# Patient Record
Sex: FEMALE | Race: WHITE | NOT HISPANIC OR LATINO
[De-identification: names, ages, dates, MRNs, and addresses within clinical notes are randomized per-mention and may not be internally consistent; named-entity substitution may affect disease eponyms.]

---

## 2017-05-19 ENCOUNTER — APPOINTMENT (OUTPATIENT)
Dept: INTERNAL MEDICINE | Facility: CLINIC | Age: 67
End: 2017-05-19

## 2017-05-19 VITALS
SYSTOLIC BLOOD PRESSURE: 113 MMHG | DIASTOLIC BLOOD PRESSURE: 79 MMHG | TEMPERATURE: 99.1 F | HEART RATE: 94 BPM | OXYGEN SATURATION: 96 % | BODY MASS INDEX: 21.87 KG/M2 | WEIGHT: 125 LBS | HEIGHT: 63.5 IN

## 2017-05-19 LAB
BILIRUB UR QL STRIP: NEGATIVE
CLARITY UR: CLEAR
COLLECTION METHOD: NORMAL
GLUCOSE UR-MCNC: NEGATIVE
HCG UR QL: 0.2 EU/DL
HGB UR QL STRIP.AUTO: NORMAL
KETONES UR-MCNC: NEGATIVE
LEUKOCYTE ESTERASE UR QL STRIP: NORMAL
NITRITE UR QL STRIP: NEGATIVE
PH UR STRIP: 6
PROT UR STRIP-MCNC: NEGATIVE
SP GR UR STRIP: 1.02

## 2017-05-19 RX ORDER — BUSPIRONE HYDROCHLORIDE 10 MG/1
10 TABLET ORAL
Qty: 60 | Refills: 0 | Status: COMPLETED | COMMUNITY
Start: 2016-12-19

## 2017-05-19 RX ORDER — LORAZEPAM 0.5 MG/1
0.5 TABLET ORAL
Qty: 90 | Refills: 0 | Status: ACTIVE | COMMUNITY
Start: 2017-02-03

## 2017-05-19 RX ORDER — CYCLOBENZAPRINE HYDROCHLORIDE 10 MG/1
10 TABLET, FILM COATED ORAL
Qty: 30 | Refills: 0 | Status: COMPLETED | COMMUNITY
Start: 2016-08-04

## 2017-05-19 RX ORDER — FLUTICASONE FUROATE 100 UG/1
100 POWDER RESPIRATORY (INHALATION)
Qty: 30 | Refills: 0 | Status: COMPLETED | COMMUNITY
Start: 2016-11-09

## 2017-08-15 ENCOUNTER — MEDICATION RENEWAL (OUTPATIENT)
Age: 67
End: 2017-08-15

## 2017-12-07 ENCOUNTER — OUTPATIENT (OUTPATIENT)
Dept: OUTPATIENT SERVICES | Facility: HOSPITAL | Age: 67
LOS: 1 days | End: 2017-12-07
Payer: MEDICARE

## 2017-12-07 DIAGNOSIS — M54.16 RADICULOPATHY, LUMBAR REGION: ICD-10-CM

## 2017-12-07 DIAGNOSIS — D36.13 BENIGN NEOPLASM OF PERIPHERAL NERVES AND AUTONOMIC NERVOUS SYSTEM OF LOWER LIMB, INCLUDING HIP: Chronic | ICD-10-CM

## 2017-12-07 DIAGNOSIS — Z98.89 OTHER SPECIFIED POSTPROCEDURAL STATES: Chronic | ICD-10-CM

## 2017-12-07 PROCEDURE — 62323 NJX INTERLAMINAR LMBR/SAC: CPT

## 2017-12-07 PROCEDURE — 77003 FLUOROGUIDE FOR SPINE INJECT: CPT

## 2017-12-20 ENCOUNTER — OUTPATIENT (OUTPATIENT)
Dept: OUTPATIENT SERVICES | Facility: HOSPITAL | Age: 67
LOS: 1 days | End: 2017-12-20
Payer: MEDICARE

## 2017-12-20 DIAGNOSIS — Z98.89 OTHER SPECIFIED POSTPROCEDURAL STATES: Chronic | ICD-10-CM

## 2017-12-20 DIAGNOSIS — D36.13 BENIGN NEOPLASM OF PERIPHERAL NERVES AND AUTONOMIC NERVOUS SYSTEM OF LOWER LIMB, INCLUDING HIP: Chronic | ICD-10-CM

## 2017-12-20 DIAGNOSIS — M54.16 RADICULOPATHY, LUMBAR REGION: ICD-10-CM

## 2017-12-20 PROCEDURE — 77003 FLUOROGUIDE FOR SPINE INJECT: CPT

## 2017-12-20 PROCEDURE — 62323 NJX INTERLAMINAR LMBR/SAC: CPT

## 2018-01-03 ENCOUNTER — RX RENEWAL (OUTPATIENT)
Age: 68
End: 2018-01-03

## 2018-04-20 ENCOUNTER — RX RENEWAL (OUTPATIENT)
Age: 68
End: 2018-04-20

## 2018-06-26 ENCOUNTER — TRANSCRIPTION ENCOUNTER (OUTPATIENT)
Age: 68
End: 2018-06-26

## 2018-06-26 ENCOUNTER — APPOINTMENT (OUTPATIENT)
Dept: INTERNAL MEDICINE | Facility: CLINIC | Age: 68
End: 2018-06-26
Payer: MEDICARE

## 2018-06-26 ENCOUNTER — NON-APPOINTMENT (OUTPATIENT)
Age: 68
End: 2018-06-26

## 2018-06-26 VITALS
HEIGHT: 63.5 IN | TEMPERATURE: 98.6 F | SYSTOLIC BLOOD PRESSURE: 125 MMHG | BODY MASS INDEX: 21.7 KG/M2 | HEART RATE: 87 BPM | DIASTOLIC BLOOD PRESSURE: 80 MMHG | WEIGHT: 124 LBS | OXYGEN SATURATION: 96 %

## 2018-06-26 DIAGNOSIS — R68.89 OTHER GENERAL SYMPTOMS AND SIGNS: ICD-10-CM

## 2018-06-26 DIAGNOSIS — K59.00 CONSTIPATION, UNSPECIFIED: ICD-10-CM

## 2018-06-26 DIAGNOSIS — R05 COUGH: ICD-10-CM

## 2018-06-26 DIAGNOSIS — Z86.39 PERSONAL HISTORY OF OTHER ENDOCRINE, NUTRITIONAL AND METABOLIC DISEASE: ICD-10-CM

## 2018-06-26 PROCEDURE — G0439: CPT

## 2018-06-26 PROCEDURE — 93000 ELECTROCARDIOGRAM COMPLETE: CPT

## 2018-06-26 PROCEDURE — 36415 COLL VENOUS BLD VENIPUNCTURE: CPT

## 2018-06-26 PROCEDURE — 99214 OFFICE O/P EST MOD 30 MIN: CPT | Mod: 25

## 2018-06-26 RX ORDER — ZALEPLON 5 MG/1
5 CAPSULE ORAL
Qty: 30 | Refills: 0 | Status: ACTIVE | COMMUNITY
Start: 2018-06-26

## 2018-06-26 RX ORDER — ZALEPLON 10 MG/1
10 CAPSULE ORAL
Qty: 30 | Refills: 0 | Status: DISCONTINUED | COMMUNITY
Start: 2016-10-14 | End: 2018-06-26

## 2018-06-26 RX ORDER — ZALEPLON 5 MG/1
5 CAPSULE ORAL
Qty: 60 | Refills: 0 | Status: DISCONTINUED | COMMUNITY
Start: 2016-11-28 | End: 2018-06-26

## 2018-06-26 NOTE — PHYSICAL EXAM
[No Acute Distress] : no acute distress [Well Nourished] : well nourished [Normal Sclera/Conjunctiva] : normal sclera/conjunctiva [PERRL] : pupils equal round and reactive to light [Normal Outer Ear/Nose] : the outer ears and nose were normal in appearance [Normal Oropharynx] : the oropharynx was normal [No JVD] : no jugular venous distention [Supple] : supple [No Respiratory Distress] : no respiratory distress  [Clear to Auscultation] : lungs were clear to auscultation bilaterally [Normal Rate] : normal rate  [Regular Rhythm] : with a regular rhythm [de-identified] : not congested

## 2018-06-26 NOTE — PLAN
[FreeTextEntry1] : Annual exam\par had flu and prevnar\par see gyn and mammo\par colon dec\par \par \par numerous issues mostly chronic\par throat clearing and cough will try benzoate\par rhinorrhea on ipratropium\par insomnia\par fibromyalgia and back ache\par full blood and check thyroid

## 2018-06-26 NOTE — HISTORY OF PRESENT ILLNESS
[FreeTextEntry1] : Annual [de-identified] : Annual exam\par last colon dec 2017(ct scan)\par SEE GYN\par Yearly mammo\par \par \par fibromyalgia    is not well\par back is not well to neuro\par see Dr Roshan treadwell on thyroid??\par still trouble sleeping\par chronic cough since surgery for pth(In chest)\par no drip\par ent is trying omeprazole  couple weeks no help\par diaphram damaged\par no heartburn\par

## 2018-06-27 LAB
25(OH)D3 SERPL-MCNC: 18.3 NG/ML
ALBUMIN SERPL ELPH-MCNC: 4.4 G/DL
ALP BLD-CCNC: 73 U/L
ALT SERPL-CCNC: 14 U/L
ANION GAP SERPL CALC-SCNC: 15 MMOL/L
AST SERPL-CCNC: 19 U/L
BASOPHILS # BLD AUTO: 0.01 K/UL
BASOPHILS NFR BLD AUTO: 0.1 %
BILIRUB SERPL-MCNC: 0.4 MG/DL
BUN SERPL-MCNC: 31 MG/DL
CALCIUM SERPL-MCNC: 9.3 MG/DL
CHLORIDE SERPL-SCNC: 103 MMOL/L
CHOLEST SERPL-MCNC: 228 MG/DL
CHOLEST/HDLC SERPL: 4.9 RATIO
CO2 SERPL-SCNC: 24 MMOL/L
CREAT SERPL-MCNC: 1.09 MG/DL
EOSINOPHIL # BLD AUTO: 0.05 K/UL
EOSINOPHIL NFR BLD AUTO: 0.7 %
GLUCOSE SERPL-MCNC: 85 MG/DL
HBA1C MFR BLD HPLC: 5.1 %
HCT VFR BLD CALC: 42.8 %
HDLC SERPL-MCNC: 47 MG/DL
HGB BLD-MCNC: 14.1 G/DL
IMM GRANULOCYTES NFR BLD AUTO: 0.4 %
LDLC SERPL CALC-MCNC: 123 MG/DL
LYMPHOCYTES # BLD AUTO: 1.57 K/UL
LYMPHOCYTES NFR BLD AUTO: 21.8 %
MAN DIFF?: NORMAL
MCHC RBC-ENTMCNC: 29 PG
MCHC RBC-ENTMCNC: 32.9 GM/DL
MCV RBC AUTO: 87.9 FL
MONOCYTES # BLD AUTO: 0.6 K/UL
MONOCYTES NFR BLD AUTO: 8.3 %
NEUTROPHILS # BLD AUTO: 4.95 K/UL
NEUTROPHILS NFR BLD AUTO: 68.7 %
PLATELET # BLD AUTO: 259 K/UL
POTASSIUM SERPL-SCNC: 5.1 MMOL/L
PROT SERPL-MCNC: 7 G/DL
RBC # BLD: 4.87 M/UL
RBC # FLD: 13.6 %
SODIUM SERPL-SCNC: 142 MMOL/L
T4 SERPL-MCNC: 9.1 UG/DL
TRIGL SERPL-MCNC: 291 MG/DL
TSH SERPL-ACNC: 2.1 UIU/ML
WBC # FLD AUTO: 7.21 K/UL

## 2018-07-31 ENCOUNTER — APPOINTMENT (OUTPATIENT)
Dept: OTOLARYNGOLOGY | Facility: CLINIC | Age: 68
End: 2018-07-31

## 2018-08-13 ENCOUNTER — RESULT REVIEW (OUTPATIENT)
Age: 68
End: 2018-08-13

## 2018-08-15 ENCOUNTER — MEDICATION RENEWAL (OUTPATIENT)
Age: 68
End: 2018-08-15

## 2018-10-09 ENCOUNTER — RX RENEWAL (OUTPATIENT)
Age: 68
End: 2018-10-09

## 2018-12-18 ENCOUNTER — MEDICATION RENEWAL (OUTPATIENT)
Age: 68
End: 2018-12-18

## 2019-01-30 ENCOUNTER — APPOINTMENT (OUTPATIENT)
Dept: ORTHOPEDIC SURGERY | Facility: CLINIC | Age: 69
End: 2019-01-30
Payer: MEDICARE

## 2019-01-30 VITALS — WEIGHT: 124 LBS | HEIGHT: 63.5 IN | BODY MASS INDEX: 21.7 KG/M2

## 2019-01-30 DIAGNOSIS — S70.11XA CONTUSION OF RIGHT THIGH, INITIAL ENCOUNTER: ICD-10-CM

## 2019-01-30 DIAGNOSIS — S40.011A CONTUSION OF RIGHT SHOULDER, INITIAL ENCOUNTER: ICD-10-CM

## 2019-01-30 PROCEDURE — 99203 OFFICE O/P NEW LOW 30 MIN: CPT

## 2019-01-30 PROCEDURE — 73030 X-RAY EXAM OF SHOULDER: CPT | Mod: RT

## 2019-02-07 ENCOUNTER — MEDICATION RENEWAL (OUTPATIENT)
Age: 69
End: 2019-02-07

## 2019-04-11 ENCOUNTER — TRANSCRIPTION ENCOUNTER (OUTPATIENT)
Age: 69
End: 2019-04-11

## 2019-04-22 ENCOUNTER — RX RENEWAL (OUTPATIENT)
Age: 69
End: 2019-04-22

## 2019-07-29 ENCOUNTER — APPOINTMENT (OUTPATIENT)
Dept: INTERNAL MEDICINE | Facility: CLINIC | Age: 69
End: 2019-07-29
Payer: MEDICARE

## 2019-07-29 ENCOUNTER — NON-APPOINTMENT (OUTPATIENT)
Age: 69
End: 2019-07-29

## 2019-07-29 VITALS
HEIGHT: 63.5 IN | HEART RATE: 90 BPM | BODY MASS INDEX: 23.45 KG/M2 | DIASTOLIC BLOOD PRESSURE: 83 MMHG | SYSTOLIC BLOOD PRESSURE: 116 MMHG | TEMPERATURE: 98.7 F | WEIGHT: 134 LBS | OXYGEN SATURATION: 97 %

## 2019-07-29 DIAGNOSIS — G47.00 INSOMNIA, UNSPECIFIED: ICD-10-CM

## 2019-07-29 DIAGNOSIS — Z87.440 PERSONAL HISTORY OF URINARY (TRACT) INFECTIONS: ICD-10-CM

## 2019-07-29 LAB
BILIRUB UR QL STRIP: NORMAL
CLARITY UR: NORMAL
COLLECTION METHOD: NORMAL
GLUCOSE UR-MCNC: NORMAL
HCG UR QL: 0.2 EU/DL
HGB UR QL STRIP.AUTO: NORMAL
KETONES UR-MCNC: NORMAL
LEUKOCYTE ESTERASE UR QL STRIP: NORMAL
NITRITE UR QL STRIP: NORMAL
PH UR STRIP: 5.5
PROT UR STRIP-MCNC: NORMAL
SP GR UR STRIP: 1.02

## 2019-07-29 PROCEDURE — 99214 OFFICE O/P EST MOD 30 MIN: CPT | Mod: 25

## 2019-07-29 PROCEDURE — 36415 COLL VENOUS BLD VENIPUNCTURE: CPT

## 2019-07-29 PROCEDURE — 93000 ELECTROCARDIOGRAM COMPLETE: CPT

## 2019-07-29 PROCEDURE — G0439: CPT

## 2019-07-29 RX ORDER — BENZONATATE 100 MG/1
100 CAPSULE ORAL 3 TIMES DAILY
Qty: 90 | Refills: 1 | Status: DISCONTINUED | COMMUNITY
Start: 2018-06-26 | End: 2019-07-29

## 2019-07-29 RX ORDER — CETIRIZINE HYDROCHLORIDE 10 MG/1
10 TABLET, FILM COATED ORAL DAILY
Qty: 30 | Refills: 0 | Status: ACTIVE | COMMUNITY
Start: 2019-07-29

## 2019-07-29 RX ORDER — LEVOTHYROXINE SODIUM 0.05 MG/1
50 TABLET ORAL
Qty: 90 | Refills: 3 | Status: ACTIVE | COMMUNITY
Start: 2019-07-29

## 2019-07-29 NOTE — PHYSICAL EXAM
[No Acute Distress] : no acute distress [Normal Outer Ear/Nose] : the outer ears and nose were normal in appearance [No Lymphadenopathy] : no lymphadenopathy [No JVD] : no jugular venous distention [Normal Oropharynx] : the oropharynx was normal [No Respiratory Distress] : no respiratory distress  [No Accessory Muscle Use] : no accessory muscle use [Normal Rate] : normal rate  [No HSM] : no HSM [Regular Rhythm] : with a regular rhythm [No Edema] : there was no peripheral edema

## 2019-07-29 NOTE — REVIEW OF SYSTEMS
[Fever] : no fever [Night Sweats] : no night sweats [Earache] : no earache [Nasal Discharge] : no nasal discharge [Chest Pain] : no chest pain [Shortness Of Breath] : no shortness of breath [Orthopnea] : no orthopnea [Wheezing] : no wheezing [Abdominal Pain] : no abdominal pain [Vomiting] : no vomiting [Muscle Pain] : no muscle pain [Joint Pain] : no joint pain

## 2019-07-30 LAB
25(OH)D3 SERPL-MCNC: 67.8 NG/ML
ALBUMIN SERPL ELPH-MCNC: 4.5 G/DL
ALP BLD-CCNC: 75 U/L
ALT SERPL-CCNC: 11 U/L
ANION GAP SERPL CALC-SCNC: 13 MMOL/L
AST SERPL-CCNC: 14 U/L
BASOPHILS # BLD AUTO: 0.02 K/UL
BASOPHILS NFR BLD AUTO: 0.2 %
BILIRUB SERPL-MCNC: 0.4 MG/DL
BUN SERPL-MCNC: 25 MG/DL
CALCIUM SERPL-MCNC: 9.4 MG/DL
CHLORIDE SERPL-SCNC: 105 MMOL/L
CHOLEST SERPL-MCNC: 180 MG/DL
CHOLEST/HDLC SERPL: 4.4 RATIO
CO2 SERPL-SCNC: 26 MMOL/L
CREAT SERPL-MCNC: 1.17 MG/DL
EOSINOPHIL # BLD AUTO: 0.04 K/UL
EOSINOPHIL NFR BLD AUTO: 0.5 %
ESTIMATED AVERAGE GLUCOSE: 97 MG/DL
GLUCOSE SERPL-MCNC: 76 MG/DL
HBA1C MFR BLD HPLC: 5 %
HCT VFR BLD CALC: 43.7 %
HDLC SERPL-MCNC: 41 MG/DL
HGB BLD-MCNC: 14.3 G/DL
IMM GRANULOCYTES NFR BLD AUTO: 0.3 %
LDLC SERPL CALC-MCNC: 102 MG/DL
LYMPHOCYTES # BLD AUTO: 1.77 K/UL
LYMPHOCYTES NFR BLD AUTO: 20.5 %
MAN DIFF?: NORMAL
MCHC RBC-ENTMCNC: 29.2 PG
MCHC RBC-ENTMCNC: 32.7 GM/DL
MCV RBC AUTO: 89.2 FL
MEV IGG FLD QL IA: >300 AU/ML
MEV IGG+IGM SER-IMP: POSITIVE
MONOCYTES # BLD AUTO: 0.6 K/UL
MONOCYTES NFR BLD AUTO: 7 %
MUV AB SER-ACNC: POSITIVE
MUV IGG SER QL IA: 74.1 AU/ML
NEUTROPHILS # BLD AUTO: 6.17 K/UL
NEUTROPHILS NFR BLD AUTO: 71.5 %
PLATELET # BLD AUTO: 282 K/UL
POTASSIUM SERPL-SCNC: 4.7 MMOL/L
PROT SERPL-MCNC: 6.8 G/DL
RBC # BLD: 4.9 M/UL
RBC # FLD: 13 %
RUBV IGG FLD-ACNC: <0.1 INDEX
RUBV IGG SER-IMP: NEGATIVE
SODIUM SERPL-SCNC: 144 MMOL/L
T4 FREE SERPL-MCNC: 1.2 NG/DL
TRIGL SERPL-MCNC: 187 MG/DL
TSH SERPL-ACNC: 2.3 UIU/ML
WBC # FLD AUTO: 8.63 K/UL

## 2019-09-25 ENCOUNTER — RESULT REVIEW (OUTPATIENT)
Age: 69
End: 2019-09-25

## 2019-10-30 ENCOUNTER — RX RENEWAL (OUTPATIENT)
Age: 69
End: 2019-10-30

## 2020-01-17 ENCOUNTER — APPOINTMENT (OUTPATIENT)
Dept: INTERNAL MEDICINE | Facility: CLINIC | Age: 70
End: 2020-01-17
Payer: MEDICARE

## 2020-01-17 VITALS
HEIGHT: 63.5 IN | DIASTOLIC BLOOD PRESSURE: 84 MMHG | TEMPERATURE: 98.5 F | SYSTOLIC BLOOD PRESSURE: 125 MMHG | HEART RATE: 101 BPM | OXYGEN SATURATION: 95 %

## 2020-01-17 DIAGNOSIS — M54.9 DORSALGIA, UNSPECIFIED: ICD-10-CM

## 2020-01-17 DIAGNOSIS — F41.8 OTHER SPECIFIED ANXIETY DISORDERS: ICD-10-CM

## 2020-01-17 PROCEDURE — 99214 OFFICE O/P EST MOD 30 MIN: CPT | Mod: 25

## 2020-01-17 PROCEDURE — 36415 COLL VENOUS BLD VENIPUNCTURE: CPT

## 2020-01-17 NOTE — PLAN
[FreeTextEntry1] : NUMEROUS ISSUE\par refilled med\par 6 month blood tests\par lipids and thyroid discussed

## 2020-01-17 NOTE — HISTORY OF PRESENT ILLNESS
[de-identified] : Fatique and fibromyalgia\par see cardiologist Dr Allison\par Rheum Dr Figueroa\par trouble sleeping\par  [FreeTextEntry1] : Hailey

## 2020-01-17 NOTE — PHYSICAL EXAM
[Well Nourished] : well nourished [Normal Outer Ear/Nose] : the outer ears and nose were normal in appearance [No Acute Distress] : no acute distress [No JVD] : no jugular venous distention [No Lymphadenopathy] : no lymphadenopathy [Normal Oropharynx] : the oropharynx was normal [No Accessory Muscle Use] : no accessory muscle use [No Respiratory Distress] : no respiratory distress  [Normal Rate] : normal rate  [Regular Rhythm] : with a regular rhythm [No Edema] : there was no peripheral edema [No HSM] : no HSM [Soft] : abdomen soft

## 2020-01-17 NOTE — REVIEW OF SYSTEMS
[Fever] : no fever [Nasal Discharge] : no nasal discharge [Earache] : no earache [Night Sweats] : no night sweats [Orthopnea] : no orthopnea [Shortness Of Breath] : no shortness of breath [Chest Pain] : no chest pain [Wheezing] : no wheezing [Abdominal Pain] : no abdominal pain [Vomiting] : no vomiting

## 2020-01-19 LAB
24R-OH-CALCIDIOL SERPL-MCNC: 98.8 PG/ML
25(OH)D3 SERPL-MCNC: 67 NG/ML
ALBUMIN SERPL ELPH-MCNC: 5 G/DL
ALP BLD-CCNC: 77 U/L
ALT SERPL-CCNC: 10 U/L
ANION GAP SERPL CALC-SCNC: 16 MMOL/L
AST SERPL-CCNC: 16 U/L
BASOPHILS # BLD AUTO: 0.02 K/UL
BASOPHILS NFR BLD AUTO: 0.3 %
BILIRUB SERPL-MCNC: 0.6 MG/DL
BUN SERPL-MCNC: 24 MG/DL
CALCIUM SERPL-MCNC: 9.9 MG/DL
CHLORIDE SERPL-SCNC: 101 MMOL/L
CHOLEST SERPL-MCNC: 207 MG/DL
CHOLEST/HDLC SERPL: 3.9 RATIO
CO2 SERPL-SCNC: 25 MMOL/L
CREAT SERPL-MCNC: 1.04 MG/DL
EOSINOPHIL # BLD AUTO: 0.04 K/UL
EOSINOPHIL NFR BLD AUTO: 0.5 %
ESTIMATED AVERAGE GLUCOSE: 97 MG/DL
FOLATE SERPL-MCNC: 6.1 NG/ML
GLUCOSE SERPL-MCNC: 68 MG/DL
HBA1C MFR BLD HPLC: 5 %
HCT VFR BLD CALC: 47.9 %
HDLC SERPL-MCNC: 53 MG/DL
HGB BLD-MCNC: 15.8 G/DL
IMM GRANULOCYTES NFR BLD AUTO: 0.3 %
LDLC SERPL CALC-MCNC: 120 MG/DL
LYMPHOCYTES # BLD AUTO: 1.85 K/UL
LYMPHOCYTES NFR BLD AUTO: 23.2 %
MAN DIFF?: NORMAL
MCHC RBC-ENTMCNC: 29.1 PG
MCHC RBC-ENTMCNC: 33 GM/DL
MCV RBC AUTO: 88.2 FL
MONOCYTES # BLD AUTO: 0.61 K/UL
MONOCYTES NFR BLD AUTO: 7.6 %
NEUTROPHILS # BLD AUTO: 5.45 K/UL
NEUTROPHILS NFR BLD AUTO: 68.1 %
PLATELET # BLD AUTO: 281 K/UL
POTASSIUM SERPL-SCNC: 4.9 MMOL/L
PROT SERPL-MCNC: 7.5 G/DL
RBC # BLD: 5.43 M/UL
RBC # FLD: 13.8 %
SODIUM SERPL-SCNC: 141 MMOL/L
T4 FREE SERPL-MCNC: 1.4 NG/DL
TRIGL SERPL-MCNC: 172 MG/DL
TSH SERPL-ACNC: 3.03 UIU/ML
VIT B12 SERPL-MCNC: 1474 PG/ML
WBC # FLD AUTO: 7.99 K/UL

## 2020-04-03 ENCOUNTER — RX RENEWAL (OUTPATIENT)
Age: 70
End: 2020-04-03

## 2020-12-02 ENCOUNTER — APPOINTMENT (OUTPATIENT)
Dept: UROLOGY | Facility: CLINIC | Age: 70
End: 2020-12-02
Payer: MEDICARE

## 2020-12-02 DIAGNOSIS — N28.89 OTHER SPECIFIED DISORDERS OF KIDNEY AND URETER: ICD-10-CM

## 2020-12-02 PROCEDURE — 99204 OFFICE O/P NEW MOD 45 MIN: CPT

## 2020-12-09 PROBLEM — N28.89 LEFT RENAL MASS: Status: ACTIVE | Noted: 2020-12-09

## 2020-12-09 NOTE — HISTORY OF PRESENT ILLNESS
[FreeTextEntry1] : 69y/o woman\par multiple medical problems\par Here for evaluation of left renal mass.\par \par Several CT and MRI images reviewed from 2014 to present: enhancing 1.4 cm Left midpole renal mass.\par Mass has been stable in size since at least 2014.

## 2020-12-09 NOTE — PHYSICAL EXAM
[General Appearance - Well Developed] : well developed [General Appearance - Well Nourished] : well nourished [Normal Appearance] : normal appearance [Well Groomed] : well groomed [General Appearance - In No Acute Distress] : no acute distress [] : no respiratory distress [Respiration, Rhythm And Depth] : normal respiratory rhythm and effort [Exaggerated Use Of Accessory Muscles For Inspiration] : no accessory muscle use [Abdomen Soft] : soft [Normal Station and Gait] : the gait and station were normal for the patient's age [Skin Color & Pigmentation] : normal skin color and pigmentation [Oriented To Time, Place, And Person] : oriented to person, place, and time

## 2020-12-09 NOTE — ASSESSMENT
[FreeTextEntry1] : Reviewed all relevant images.\par Mass has been stable since 2014.\par Suggested 2 options:\par Continue to monitor vs. IR consult for cryoablation\par She will monitor for now.

## 2021-06-02 ENCOUNTER — APPOINTMENT (OUTPATIENT)
Dept: UROLOGY | Facility: CLINIC | Age: 71
End: 2021-06-02

## 2021-06-12 ENCOUNTER — RX RENEWAL (OUTPATIENT)
Age: 71
End: 2021-06-12

## 2021-10-13 ENCOUNTER — APPOINTMENT (OUTPATIENT)
Dept: INTERNAL MEDICINE | Facility: CLINIC | Age: 71
End: 2021-10-13
Payer: MEDICARE

## 2021-10-13 VITALS
SYSTOLIC BLOOD PRESSURE: 124 MMHG | BODY MASS INDEX: 22.75 KG/M2 | DIASTOLIC BLOOD PRESSURE: 78 MMHG | TEMPERATURE: 97 F | HEIGHT: 63.5 IN | WEIGHT: 130 LBS

## 2021-10-13 VITALS
HEIGHT: 63.5 IN | HEART RATE: 72 BPM | DIASTOLIC BLOOD PRESSURE: 87 MMHG | TEMPERATURE: 97.5 F | OXYGEN SATURATION: 82 % | WEIGHT: 130 LBS | BODY MASS INDEX: 22.75 KG/M2 | SYSTOLIC BLOOD PRESSURE: 139 MMHG

## 2021-10-13 DIAGNOSIS — J30.9 ALLERGIC RHINITIS, UNSPECIFIED: ICD-10-CM

## 2021-10-13 DIAGNOSIS — E78.00 PURE HYPERCHOLESTEROLEMIA, UNSPECIFIED: ICD-10-CM

## 2021-10-13 DIAGNOSIS — C85.90 NON-HODGKIN LYMPHOMA, UNSPECIFIED, UNSPECIFIED SITE: ICD-10-CM

## 2021-10-13 DIAGNOSIS — E03.9 HYPOTHYROIDISM, UNSPECIFIED: ICD-10-CM

## 2021-10-13 DIAGNOSIS — C83.30 DIFFUSE LARGE B-CELL LYMPHOMA, UNSPECIFIED SITE: ICD-10-CM

## 2021-10-13 PROCEDURE — G0439: CPT

## 2021-10-13 RX ORDER — MONTELUKAST 10 MG/1
10 TABLET, FILM COATED ORAL
Qty: 1 | Refills: 3 | Status: DISCONTINUED | COMMUNITY
Start: 2019-07-29 | End: 2021-10-13

## 2021-10-13 RX ORDER — PRAVASTATIN SODIUM 10 MG/1
10 TABLET ORAL
Qty: 30 | Refills: 0 | Status: ACTIVE | COMMUNITY
Start: 2019-07-29

## 2021-10-13 RX ORDER — CYCLOBENZAPRINE HYDROCHLORIDE 5 MG/1
5 TABLET, FILM COATED ORAL
Qty: 45 | Refills: 2 | Status: DISCONTINUED | COMMUNITY
Start: 2017-05-11 | End: 2021-10-13

## 2021-10-13 RX ORDER — SUVOREXANT 10 MG/1
10 TABLET, FILM COATED ORAL
Qty: 30 | Refills: 0 | Status: DISCONTINUED | COMMUNITY
Start: 2018-06-26 | End: 2021-10-13

## 2021-10-13 NOTE — HEALTH RISK ASSESSMENT
[Fair] :  ~his/her~ mood as fair [No] : No [Never (0 pts)] : Never (0 points) [No falls in past year] : Patient reported no falls in the past year [1] : 2) Feeling down, depressed, or hopeless for several days (1) [None] : None [With Family] : lives with family [Retired] : retired [College] : College [] :  [Fully functional (bathing, dressing, toileting, transferring, walking, feeding)] : Fully functional (bathing, dressing, toileting, transferring, walking, feeding) [Fully functional (using the telephone, shopping, preparing meals, housekeeping, doing laundry, using] : Fully functional and needs no help or supervision to perform IADLs (using the telephone, shopping, preparing meals, housekeeping, doing laundry, using transportation, managing medications and managing finances) [Smoke Detector] : smoke detector [Carbon Monoxide Detector] : carbon monoxide detector [Seat Belt] :  uses seat belt [Patient declined mammogram] : Patient declined mammogram [Patient declined colonoscopy] : Patient declined colonoscopy [] : No [Change in mental status noted] : No change in mental status noted [Language] : denies difficulty with language [Behavior] : denies difficulty with behavior [Learning/Retaining New Information] : denies difficulty learning/retaining new information [Handling Complex Tasks] : denies difficulty handling complex tasks [Reasoning] : denies difficulty with reasoning [Spatial Ability and Orientation] : denies difficulty with spatial ability and orientation [Reports changes in hearing] : Reports no changes in hearing [Reports changes in vision] : Reports no changes in vision [Reports changes in dental health] : Reports no changes in dental health [Guns at Home] : no guns at home

## 2021-10-13 NOTE — PLAN
[FreeTextEntry1] : Annual exam\par had all vaccine\par under care for lymphoma\par \par review of issue\par thyroid per endocrine\par oncology follow relevant issues\par

## 2021-10-13 NOTE — HISTORY OF PRESENT ILLNESS
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

## 2021-10-27 ENCOUNTER — RX CHANGE (OUTPATIENT)
Age: 71
End: 2021-10-27

## 2021-10-27 RX ORDER — IPRATROPIUM BROMIDE 42 UG/1
0.06 SPRAY NASAL
Qty: 15 | Refills: 2 | Status: DISCONTINUED | COMMUNITY
Start: 2017-05-19 | End: 2021-10-27

## 2021-10-28 ENCOUNTER — APPOINTMENT (OUTPATIENT)
Dept: ULTRASOUND IMAGING | Facility: CLINIC | Age: 71
End: 2021-10-28

## 2021-10-28 ENCOUNTER — APPOINTMENT (OUTPATIENT)
Dept: MAMMOGRAPHY | Facility: CLINIC | Age: 71
End: 2021-10-28

## 2022-03-09 ENCOUNTER — RX RENEWAL (OUTPATIENT)
Age: 72
End: 2022-03-09

## 2022-05-05 ENCOUNTER — RESULT REVIEW (OUTPATIENT)
Age: 72
End: 2022-05-05

## 2022-06-15 ENCOUNTER — RX RENEWAL (OUTPATIENT)
Age: 72
End: 2022-06-15

## 2022-08-02 ENCOUNTER — FORM ENCOUNTER (OUTPATIENT)
Age: 72
End: 2022-08-02

## 2022-08-03 ENCOUNTER — APPOINTMENT (OUTPATIENT)
Dept: MRI IMAGING | Facility: CLINIC | Age: 72
End: 2022-08-03

## 2022-08-03 ENCOUNTER — APPOINTMENT (OUTPATIENT)
Dept: ORTHOPEDIC SURGERY | Facility: CLINIC | Age: 72
End: 2022-08-03

## 2022-08-03 VITALS — HEIGHT: 63 IN | WEIGHT: 130 LBS | BODY MASS INDEX: 23.04 KG/M2

## 2022-08-03 DIAGNOSIS — S46.012A STRAIN OF MUSCLE(S) AND TENDON(S) OF THE ROTATOR CUFF OF LEFT SHOULDER, INITIAL ENCOUNTER: ICD-10-CM

## 2022-08-03 DIAGNOSIS — C80.1 MALIGNANT (PRIMARY) NEOPLASM, UNSPECIFIED: ICD-10-CM

## 2022-08-03 DIAGNOSIS — Z00.00 ENCOUNTER FOR GENERAL ADULT MEDICAL EXAMINATION W/OUT ABNORMAL FINDINGS: ICD-10-CM

## 2022-08-03 PROCEDURE — 99204 OFFICE O/P NEW MOD 45 MIN: CPT

## 2022-08-03 PROCEDURE — 73010 X-RAY EXAM OF SHOULDER BLADE: CPT | Mod: LT

## 2022-08-03 PROCEDURE — 73221 MRI JOINT UPR EXTREM W/O DYE: CPT | Mod: LT,MH

## 2022-08-03 PROCEDURE — 73030 X-RAY EXAM OF SHOULDER: CPT | Mod: LT

## 2022-08-03 NOTE — HISTORY OF PRESENT ILLNESS
[Sudden] : sudden [5] : 5 [0] : 0 [Burning] : burning [Frequent] : frequent [Household chores] : household chores [Leisure] : leisure [Sleep] : sleep [Retired] : Work status: retired [de-identified] : Left shoulder pain after falling in [employer] last Thursday. [] : no [FreeTextEntry1] : Left shoulder [FreeTextEntry3] : 7/28/22 [FreeTextEntry9] : tylenol [de-identified] : reaching [de-identified] : 7/30/22 [de-identified] : Urgent Care [de-identified] : xrays

## 2022-08-03 NOTE — REASON FOR VISIT
[FreeTextEntry2] : This is a 71 year old RHD female retired  with left shoulder pain since 7/28/22 after a fall. She went to urgent care where she had xrays taken. There were no fractures. No prior history. Reaching is painful, has trouble raising her arm overhead. Sleep is not affected. There is no n/t. Tylenol use as needed with temporary relief. Her  is here. She has history of CNS lymphoma.

## 2022-08-03 NOTE — CONSULT LETTER
[Dear  ___] : Dear  [unfilled], [Consult Letter:] : I had the pleasure of evaluating your patient, [unfilled]. [Please see my note below.] : Please see my note below. [Consult Closing:] : Thank you very much for allowing me to participate in the care of this patient.  If you have any questions, please do not hesitate to contact me. [Sincerely,] : Sincerely, [FreeTextEntry3] : Jones Martinez M.D.\par Shoulder Surgery

## 2022-08-03 NOTE — IMAGING
[Left] : left shoulder [FreeTextEntry1] : The GH and AC joints are intact and well aligned. [FreeTextEntry5] : There is a type II acromion.

## 2022-08-03 NOTE — PHYSICAL EXAM
[Left] : left shoulder [Right] : right shoulder [Mild] : mild [Sitting] : sitting [] : no erythema [FreeTextEntry3] : There is a 5cm mid anterior arm ecchymotic area. [de-identified] : Pain with flexion, external rotation is decent.  [FreeTextEntry9] : IR to T12. [TWNoteComboBox6] : internal rotation L2 [TWNoteComboBox4] : passive forward flexion 150 degrees [de-identified] : external rotation 90 degrees

## 2022-08-03 NOTE — ASSESSMENT
[FreeTextEntry1] : We discussed the underlying pathology. \par Treatment options reviewed. \par Her  is here. \par She will continue with Advil and Tylenol.\par An MRI of the left shoulder is indicated\par She will follow up after the MRI.\par If symptoms persist, a MDP may be considered. \par Cautions discussed. \par Questions answered. \par \par Patient seen by Jones Contreras M.D.\par Entered by Zita Sanchez acting as scribe.

## 2022-08-10 ENCOUNTER — APPOINTMENT (OUTPATIENT)
Dept: ORTHOPEDIC SURGERY | Facility: CLINIC | Age: 72
End: 2022-08-10

## 2022-08-15 ENCOUNTER — APPOINTMENT (OUTPATIENT)
Dept: ORTHOPEDIC SURGERY | Facility: CLINIC | Age: 72
End: 2022-08-15

## 2022-08-15 VITALS — BODY MASS INDEX: 23.04 KG/M2 | HEIGHT: 63 IN | WEIGHT: 130 LBS

## 2022-08-15 PROCEDURE — 99214 OFFICE O/P EST MOD 30 MIN: CPT

## 2022-08-15 NOTE — HISTORY OF PRESENT ILLNESS
[0] : 0 [de-identified] : pt is here today for a test follow up for her left shoulder. pt states her pain has improved since last visit, she is still in sling today. pt states she does have some left sided rib pain [FreeTextEntry1] : left shoulder [de-identified] : none

## 2022-08-15 NOTE — ASSESSMENT
[FreeTextEntry1] : We discussed the MRI findings. \par She will discontinue the sling as discussed. \par PT is prescribed. \par HEP encouraged.\par Cautions discussed. \par Questions answered. \par \par Patient seen by Jones Contreras M.D. \par Entered by Zita Sanchez acting as scribe.

## 2022-08-15 NOTE — PHYSICAL EXAM
[Left] : left shoulder [Mild] : mild [Right] : right shoulder [Sitting] : sitting [] : no erythema [FreeTextEntry3] : There is a 5cm mid anterior arm ecchymotic area. [de-identified] : Pain with flexion, external rotation is decent.  [FreeTextEntry9] : IR to T12. [TWNoteComboBox7] : active forward flexion 120 degrees [TWNoteComboBox6] : internal rotation L2 [TWNoteComboBox4] : passive forward flexion 150 degrees [de-identified] : external rotation 90 degrees

## 2022-08-15 NOTE — REASON FOR VISIT
[FreeTextEntry2] : This is a 71 year old RHD female retired  with left shoulder pain since 7/28/22 after a fall. She went to urgent care where she had xrays taken. There were no fractures. No prior history. Reaching is painful, has trouble raising her arm overhead. Sleep is not affected. There is no n/t. Tylenol use as needed with temporary relief. Her  is here. She has history of CNS lymphoma. MRI is done.

## 2022-08-15 NOTE — DATA REVIEWED
[FreeTextEntry1] : MRI left shoulder 8/3/22:\par The fracture is in decent position. There is no major cuff tear. 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

## 2022-08-21 ENCOUNTER — RX RENEWAL (OUTPATIENT)
Age: 72
End: 2022-08-21

## 2022-08-21 RX ORDER — IPRATROPIUM BROMIDE 42 UG/1
0.06 SPRAY NASAL
Qty: 1 | Refills: 5 | Status: ACTIVE | COMMUNITY
Start: 2021-10-27 | End: 1900-01-01

## 2022-09-28 ENCOUNTER — APPOINTMENT (OUTPATIENT)
Dept: ORTHOPEDIC SURGERY | Facility: CLINIC | Age: 72
End: 2022-09-28

## 2022-09-28 VITALS — BODY MASS INDEX: 23.04 KG/M2 | WEIGHT: 130 LBS | HEIGHT: 63 IN

## 2022-09-28 DIAGNOSIS — S42.255A NONDISPLACED FRACTURE OF GREATER TUBEROSITY OF LEFT HUMERUS, INITIAL ENCOUNTER FOR CLOSED FRACTURE: ICD-10-CM

## 2022-09-28 PROCEDURE — 99214 OFFICE O/P EST MOD 30 MIN: CPT

## 2022-09-28 PROCEDURE — 99072 ADDL SUPL MATRL&STAF TM PHE: CPT

## 2022-09-28 NOTE — PHYSICAL EXAM
[Left] : left shoulder [Right] : right shoulder [Sitting] : sitting [5 ___] : forward flexion 5[unfilled]/5 [5___] : external rotation 5[unfilled]/5 [] : no erythema [FreeTextEntry8] : This is slight. [FreeTextEntry9] : IR to T12. [TWNoteComboBox7] : active forward flexion 120 degrees [TWNoteComboBox6] : internal rotation L2 [TWNoteComboBox4] : passive forward flexion 150 degrees [de-identified] : external rotation 90 degrees

## 2022-09-28 NOTE — ASSESSMENT
[FreeTextEntry1] : She is doing very well.\par PT in [place] is an option.\par Her and her 's questions were answered.\par Follow up here will be at their request.

## 2022-09-28 NOTE — REASON FOR VISIT
[FreeTextEntry2] : This is a 72 year old RHD female retired  with left shoulder pain since 7/28/22 after a fall. She went to urgent care where she had xrays taken. There were no fractures. No prior history. There is no n/t. She has history of CNS lymphoma. MRI is done.  With PT, she feels 90% better.  Her  is here.

## 2022-09-28 NOTE — PHYSICAL EXAM
[Left] : left shoulder [Right] : right shoulder [Sitting] : sitting [5 ___] : forward flexion 5[unfilled]/5 [5___] : external rotation 5[unfilled]/5 [] : no erythema [FreeTextEntry8] : This is slight. [FreeTextEntry9] : IR to T12. [TWNoteComboBox7] : active forward flexion 120 degrees [TWNoteComboBox6] : internal rotation L2 [TWNoteComboBox4] : passive forward flexion 150 degrees [de-identified] : external rotation 90 degrees

## 2022-09-28 NOTE — HISTORY OF PRESENT ILLNESS
[1] : 2 [0] : 0 [Dull/Aching] : dull/aching [] : yes [Intermittent] : intermittent [Heat] : heat [Retired] : Work status: retired [de-identified] : Patient is here for a follow up on her left shoulder.  [FreeTextEntry1] : left shoulder [FreeTextEntry7] : upper arm [de-identified] : Physical therapy 2 x a week, HEP.

## 2022-09-28 NOTE — HISTORY OF PRESENT ILLNESS
[1] : 2 [0] : 0 [Dull/Aching] : dull/aching [] : yes [Intermittent] : intermittent [Heat] : heat [Retired] : Work status: retired [de-identified] : Patient is here for a follow up on her left shoulder.  [FreeTextEntry1] : left shoulder [FreeTextEntry7] : upper arm [de-identified] : Physical therapy 2 x a week, HEP.

## 2022-10-02 ENCOUNTER — RX RENEWAL (OUTPATIENT)
Age: 72
End: 2022-10-02

## 2022-10-02 RX ORDER — GABAPENTIN 300 MG/1
300 CAPSULE ORAL
Qty: 90 | Refills: 5 | Status: ACTIVE | COMMUNITY
Start: 2018-06-26 | End: 1900-01-01

## 2023-02-25 ENCOUNTER — TRANSCRIPTION ENCOUNTER (OUTPATIENT)
Age: 73
End: 2023-02-25

## 2023-04-18 ENCOUNTER — NON-APPOINTMENT (OUTPATIENT)
Age: 73
End: 2023-04-18

## 2023-05-11 ENCOUNTER — APPOINTMENT (OUTPATIENT)
Dept: ORTHOPEDIC SURGERY | Facility: CLINIC | Age: 73
End: 2023-05-11
Payer: MEDICARE

## 2023-05-11 VITALS — WEIGHT: 130 LBS | BODY MASS INDEX: 23.04 KG/M2 | HEIGHT: 63 IN

## 2023-05-11 DIAGNOSIS — M19.049 PRIMARY OSTEOARTHRITIS, UNSPECIFIED HAND: ICD-10-CM

## 2023-05-11 PROCEDURE — 99213 OFFICE O/P EST LOW 20 MIN: CPT

## 2023-05-11 PROCEDURE — 73130 X-RAY EXAM OF HAND: CPT | Mod: 50

## 2023-05-11 NOTE — IMAGING
[de-identified] : LEFT HAND\par skin intact. no swelling.\par mild TTP to radial styloid.\par wrist ROM: good extension, flexion. good pronation, supination.\par good EPL, FPL. good finger extension, flex to full fist. good finger abduction and adduction. \par SILT to median, ulnar, radial distribution. \par palpable radial pulse, brisk cap refill all digits.\par no triggering.\par equivocal Finkelstein's test.\par \par \par RIGHT HAND\par skin intact. mild swelling over dorsal radial hand.\par TTP to 2nd CMCJ. mild TTP to PIPJ of IF/MF.\par wrist ROM: mild limited extension, flexion. good pronation, supination.\par good EPL, FPL. good finger extension, flex to full fist. good finger abduction and adduction. \par SILT to median, ulnar, radial distribution. \par palpable radial pulse, brisk cap refill all digits.\par no triggering.\par \par \par XRAYS OF LEFT HAND: no acute displaced fracture or dislocation. djd of DIPJ of index, middle fingers.\par XRAYS OF LEFT HAND: no acute displaced fracture or dislocation. djd of DIPJ of index, middle fingers. mild djd of thumb CMCJ.

## 2023-05-11 NOTE — HISTORY OF PRESENT ILLNESS
[Gradual] : gradual [6] : 6 [9] : 9 [Burning] : burning [Radiating] : radiating [Intermittent] : intermittent [Nothing helps with pain getting better] : Nothing helps with pain getting better [Retired] : Work status: retired [de-identified] : 5/11/23: 71yo RHD female (retired) presents with  for RIGHT > LEFT hand/wrist pain for a couple months.\par Denies injury.\par Using Tylenol, Arnica gel PRN. Currently on Xarelto for LLE DVT, unable to take Advil, which she usually uses for pain with good relief.\par Ambulates with cane/walker.\par Seeing Rheumatologist for fibromyalgia.\par \par Hx: fibromyalgia. DVT of LLE (4/2023) - on Xarelto. hypothyroidism. lymphoma. HLD. anxiety.\par s/p VATS thymectomy for hypercellular parathyroid. [] : no [de-identified] : usage [de-identified] : xrays

## 2023-05-11 NOTE — ASSESSMENT
[FreeTextEntry1] : The condition was explained to the patient.\par Discussed that arthritis is a progressive degenerative process, and symptoms may have a waxing/waning course, which may be exacerbated by activity or trauma. Discussed treatment options - activity modification, bracing, steroid injection, or last resort surgery.\par - recommend wrist brace, activity modification, moist heat vs ice PRN.\par - medical management of arthritis per Rheumatologist. cannot take NSAIDs until off anticoagulation for DVT.\par \par F/u PRN.

## 2023-05-16 ENCOUNTER — APPOINTMENT (OUTPATIENT)
Dept: VASCULAR SURGERY | Facility: CLINIC | Age: 73
End: 2023-05-16

## 2023-05-23 ENCOUNTER — NON-APPOINTMENT (OUTPATIENT)
Age: 73
End: 2023-05-23

## 2023-05-25 ENCOUNTER — APPOINTMENT (OUTPATIENT)
Dept: ORTHOPEDIC SURGERY | Facility: CLINIC | Age: 73
End: 2023-05-25

## 2023-06-22 ENCOUNTER — OUTPATIENT (OUTPATIENT)
Dept: OUTPATIENT SERVICES | Facility: HOSPITAL | Age: 73
LOS: 1 days | End: 2023-06-22
Payer: MEDICARE

## 2023-06-22 ENCOUNTER — APPOINTMENT (OUTPATIENT)
Dept: CT IMAGING | Facility: CLINIC | Age: 73
End: 2023-06-22
Payer: MEDICARE

## 2023-06-22 DIAGNOSIS — Z00.8 ENCOUNTER FOR OTHER GENERAL EXAMINATION: ICD-10-CM

## 2023-06-22 DIAGNOSIS — D36.13 BENIGN NEOPLASM OF PERIPHERAL NERVES AND AUTONOMIC NERVOUS SYSTEM OF LOWER LIMB, INCLUDING HIP: Chronic | ICD-10-CM

## 2023-06-22 DIAGNOSIS — Z98.89 OTHER SPECIFIED POSTPROCEDURAL STATES: Chronic | ICD-10-CM

## 2023-06-22 PROCEDURE — 74176 CT ABD & PELVIS W/O CONTRAST: CPT | Mod: 26,MH

## 2023-06-22 PROCEDURE — 74176 CT ABD & PELVIS W/O CONTRAST: CPT

## 2023-07-03 ENCOUNTER — APPOINTMENT (OUTPATIENT)
Dept: ORTHOPEDIC SURGERY | Facility: CLINIC | Age: 73
End: 2023-07-03
Payer: MEDICARE

## 2023-07-03 VITALS — BODY MASS INDEX: 20.91 KG/M2 | WEIGHT: 118 LBS | HEIGHT: 63 IN

## 2023-07-03 DIAGNOSIS — M79.7 FIBROMYALGIA: ICD-10-CM

## 2023-07-03 PROCEDURE — 73070 X-RAY EXAM OF ELBOW: CPT | Mod: RT

## 2023-07-03 PROCEDURE — 99204 OFFICE O/P NEW MOD 45 MIN: CPT

## 2023-07-03 PROCEDURE — 73030 X-RAY EXAM OF SHOULDER: CPT | Mod: RT

## 2023-07-03 NOTE — ADDENDUM
[FreeTextEntry1] : I, LOVELYMATILDE MUNOZ, acted solely as a scribe for Dr. Alfonso Kaplan on this date 07/03/2023.\par \par All medical record entries made by the Scribe were at my, Dr. Alfonso Kaplan, direction and personally dictated by me on 07/03/2023. I have reviewed the chart and agree that the record accurately reflects my personal performance of the history, physical exam, assessment and plan. I have also personally directed, reviewed, and agreed with the chart.

## 2023-07-03 NOTE — DISCUSSION/SUMMARY
[de-identified] : I instructed her on applying moist heat on her thigh and ice on her shoulder. She was reassured that she does not have any fractures. She will follow-up in 6 weeks. Should her pain persist after 6 weeks, an MRI will be ordered.

## 2023-07-03 NOTE — CONSULT LETTER
[Dear  ___] : Dear  [unfilled], [Consult Letter:] : I had the pleasure of evaluating your patient, [unfilled]. [Please see my note below.] : Please see my note below. [Consult Closing:] : Thank you very much for allowing me to participate in the care of this patient.  If you have any questions, please do not hesitate to contact me. [Sincerely,] : Sincerely, [FreeTextEntry2] : SVITLANA SELF  [FreeTextEntry3] : Alfonso Kaplan M.D.

## 2023-07-03 NOTE — REASON FOR VISIT
[Shoulder Pain] : shoulder pain [Initial Visit] : an initial visit for [FreeTextEntry2] : thigh pain

## 2023-07-03 NOTE — PHYSICAL EXAM
[de-identified] : Constitutional\par o Appearance : well-nourished, well developed, alert, in no acute distress \par Head and Face\par o Head :\par ¦ Inspection : atraumatic, normocephalic\par Neurologic\par o Mental Status Examination :\par ¦ Orientation : alert and oriented X 3\par Psychiatric\par o Mood and Affect: mood normal, affect appropriate \par Cardiovascular\par o Observation/Palpation : - no swelling,normal pulses, normal temperature \par Lymphatic\par o Additional Nodes : No palpable lymph nodes present \par \par Cervical Spine\par o Inspection/Palpation :\par ¦ Inspection : alignment midline, normal degree of lordosis present\par ¦ Skin : normal appearance, no masses or tenderness, trachea midline\par ¦ Palpation : musculature is nontender to palpation\par o Range of Motion : arc of motion full in all planes, no crepitance or pain with ROM\par Tests: Negative Spurling test, negative Romberg test\par \par Right Upper Extremity\par o Shoulder :\par ¦ Inspection/Palpation : Anterior capsule tenderness, lateral epercodnal tenderness,  mild greater tuberosity tenderness, swelling or deformities, no ecchymosis\par ¦ Range of Motion : full and painless in all planes, no crepitance\par ¦ Strength : forward elevation, internal rotation, external rotation, supraspinatus, adduction and abduction 5/5\par ¦ Stability : no joint instability on provocative testing \par Tests: Tatum negative, Neer negative, negative Dread test negative drop arm test\par \par o Right Elbow :\par ¦ Inspection/Palpation : anterior capsule tenderness, no swelling or deformities\par ¦ Range of Motion : full and painless in all planes, no crepitance\par ¦ Strength : flexion and extension 3-/5\par ¦ Stability : no joint instability on provocative testing \par o Sensation : sensation intact to light touch\par o Tests: Tinel’s Sign negative, no pain with resisted extension and supination, pain with resisted extension of the middle finger,  pain with resisted extension of the wrist,  pain with gripping\par \par Left Upper Extremity\par o Shoulder :\par ¦ Inspection/Palpation : no tenderness, swelling or deformities\par ¦ Range of Motion : full and painless in all planes, no crepitance\par ¦ Strength : forward elevation, internal rotation, external rotation, adduction and abduction 5/5\par ¦ Stability : no joint instability on provocative testing\par Tests: Tatum negative, Neer negative, negative Dread test, negative drop arm test\par \par o Left Elbow :\par ¦ Inspection/Palpation : anterior capsule tenderness, no swelling or deformities\par ¦ Range of Motion : full and painless in all planes, no crepitance\par ¦ Strength : flexion and extension 5/5\par ¦ Stability : no joint instability on provocative testing \par o Sensation : sensation intact to light touch\par o Tests: Tinel’s Sign negative, no pain with resisted extension and supination, no pain with resisted extension of the middle finger, no pain with resisted extension of the wrist, no pain with gripping\par \par Gait: walks with a cane  WBAT, no Significant extremity swelling or lymphedema. \par \par Radiology Results:\par Right Shoulder: AP IR/ER and outlet views were obtained and revealed sclerosis greater tuberosity and mild to moderate OA of the glenohumeral joint and AC joint. No lytic lesions. \par Right Elbow: AP, lateral and tunnel views were obtained and revealed moderate OA of the elbow with small area calcific density sclerotic lateral epicondyle.

## 2023-07-03 NOTE — HISTORY OF PRESENT ILLNESS
[de-identified] : 72 year old RHD female presents for right shoulder and elbow pain that started a few months ago. There was no recent injury. She has history of right shoulder OA and lateral epicondylitis in the right elbow. Her pain worsens with certain motions, especially when placing a mug on the counter. Her pain radiates into her right upper arm. It does not radiate into her forearm or wrist and hand. She denies numbness and tingling. She feels her strength is slightly limited by pain. She has some pain when sleeping at night. She saw an Orthopedist in [place] who told her that her upper arm pain was bursitis but she was not treated. Of note, She has had a brain biopsy in the past for CNS lymphoma 7 years ago. She is also s/p two parathyroid surgeries. She has a history of fibromyalgia. \par \par Radiology Results \par o Right Shoulder : AP internal/external rotation and lateral views were obtained and reveal moderate arthritis of the glenohumeral joint and sclerosis of the greater tuberosity, no fracture present.

## 2023-07-05 ENCOUNTER — APPOINTMENT (OUTPATIENT)
Dept: ORTHOPEDIC SURGERY | Facility: CLINIC | Age: 73
End: 2023-07-05

## 2023-08-03 ENCOUNTER — APPOINTMENT (OUTPATIENT)
Dept: ORTHOPEDIC SURGERY | Facility: CLINIC | Age: 73
End: 2023-08-03
Payer: MEDICARE

## 2023-08-03 VITALS — BODY MASS INDEX: 20.91 KG/M2 | WEIGHT: 118 LBS | HEIGHT: 63 IN

## 2023-08-03 DIAGNOSIS — M77.11 LATERAL EPICONDYLITIS, RIGHT ELBOW: ICD-10-CM

## 2023-08-03 PROCEDURE — 20605 DRAIN/INJ JOINT/BURSA W/O US: CPT | Mod: RT

## 2023-08-03 PROCEDURE — 99213 OFFICE O/P EST LOW 20 MIN: CPT | Mod: 25

## 2023-08-03 NOTE — REASON FOR VISIT
[Initial Visit] : an initial visit for [Shoulder Pain] : shoulder pain [FreeTextEntry2] : thigh pain

## 2023-08-03 NOTE — DISCUSSION/SUMMARY
[de-identified] : I went over the pathophysiology of the patient's symptoms in great detail with the patient. The patient elected to receive a cortisone injection into her right elbow today and tolerated it well. I instructed the patient on ROM exercises and told them to take it easy. The use of ice and rest was reviewed with the patient. The patient may resume activities tomorrow. At-home strengthening exercises were discussed and demonstrated with the patient. I am recommending the patient continues to go to physical therapy to obtain increases in their strength and mobility. A prescription was provided.   All of their questions were answered. They understand and consent to the plan.   FU in one month.

## 2023-08-03 NOTE — PHYSICAL EXAM
[de-identified] : Constitutional o Appearance : well-nourished, well developed, alert, in no acute distress  Head and Face o Head :  Inspection : atraumatic, normocephalic Neurologic o Mental Status Examination :  Orientation : alert and oriented X 3 Psychiatric o Mood and Affect: mood normal, affect appropriate  Cardiovascular o Observation/Palpation : - no swelling,normal pulses, normal temperature  Lymphatic o Additional Nodes : No palpable lymph nodes present   Cervical Spine o Inspection/Palpation :  Inspection : alignment midline, normal degree of lordosis present  Skin : normal appearance, no masses or tenderness, trachea midline  Palpation : musculature is nontender to palpation o Range of Motion : arc of motion full in all planes, no crepitance or pain with ROM Tests: Negative Spurling test, negative Romberg test  Right Upper Extremity o Shoulder :  Inspection/Palpation : Anterior capsule tenderness, lateral epercodnal tenderness,  mild greater tuberosity tenderness, swelling or deformities, no ecchymosis  Range of Motion : full and painless in all planes, no crepitance  Strength : forward elevation, internal rotation, external rotation, supraspinatus, adduction and abduction 5/5  Stability : no joint instability on provocative testing  Tests: Tatum negative, Neer negative, negative Dread test negative drop arm test  o Right Elbow :  Inspection/Palpation : anterior capsule tenderness, significant epicondyle tenderness, no swelling or deformities  Range of Motion : full and painless in all planes, no crepitance,   Strength : flexion and extension 3-/5  Stability : no joint instability on provocative testing  o Sensation : sensation intact to light touch o Tests: Tinel's Sign negative, no pain with resisted extension and supination, pain with resisted extension of the middle finger,  pain with resisted extension of the wrist,  pain with gripping  Left Upper Extremity o Shoulder :  Inspection/Palpation : no tenderness, swelling or deformities  Range of Motion : full and painless in all planes, no crepitance  Strength : forward elevation, internal rotation, external rotation, adduction and abduction 5/5  Stability : no joint instability on provocative testing Tests: Tatum negative, Neer negative, negative Dread test, negative drop arm test  o Left Elbow :  Inspection/Palpation : anterior capsule tenderness, no swelling or deformities  Range of Motion : full and painless in all planes, no crepitance  Strength : flexion and extension 5/5  Stability : no joint instability on provocative testing  o Sensation : sensation intact to light touch o Tests: Tinel's Sign negative, no pain with resisted extension and supination, no pain with resisted extension of the middle finger, no pain with resisted extension of the wrist, no pain with gripping  Gait: walks with a cane  WBAT, no Significant extremity swelling or lymphedema.   o Elbow : Indication- right lateral epicondylitis, Anatomic location- right area of the lateral epicondyle, Spray - area was sterilized with Betadine and alcohol and anesthetized with Ethyl Chloride , needle used-25G, Medications given- 1cc's lidocaine, 0.5cc's kenalog, 0.5cc's dexamethasone. The patient tolerated the procedure well.

## 2023-08-03 NOTE — HISTORY OF PRESENT ILLNESS
[de-identified] : 72-year-old RHD female presents for a follow-up of the right shoulder and elbow pain. She continues to have significant pain. She has history of right shoulder OA and lateral epicondylitis in the right elbow.  She is currently attending formal PT. She wants to continue attending PT. She is also s/p two parathyroid surgeries. She has a history of fibromyalgia.   Radiology Results: 07/03/2023 Right Shoulder: AP IR/ER and outlet views were obtained and revealed sclerosis greater tuberosity and mild to moderate OA of the glenohumeral joint and AC joint. No lytic lesions.  Right Elbow: AP, lateral and tunnel views were obtained and revealed moderate OA of the elbow with small area calcific density sclerotic lateral epicondyle.   Radiology Results  o Right Shoulder : AP internal/external rotation and lateral views were obtained and reveal moderate arthritis of the glenohumeral joint and sclerosis of the greater tuberosity, no fracture present.

## 2023-09-11 ENCOUNTER — APPOINTMENT (OUTPATIENT)
Dept: ORTHOPEDIC SURGERY | Facility: CLINIC | Age: 73
End: 2023-09-11
Payer: MEDICARE

## 2023-09-11 DIAGNOSIS — M19.029 PRIMARY OSTEOARTHRITIS, UNSPECIFIED ELBOW: ICD-10-CM

## 2023-09-11 PROCEDURE — 99213 OFFICE O/P EST LOW 20 MIN: CPT

## 2024-06-11 ENCOUNTER — EMERGENCY (EMERGENCY)
Facility: HOSPITAL | Age: 74
LOS: 1 days | Discharge: HOME | End: 2024-06-11
Attending: EMERGENCY MEDICINE | Admitting: EMERGENCY MEDICINE
Payer: MEDICARE

## 2024-06-11 VITALS
TEMPERATURE: 98 F | WEIGHT: 115.08 LBS | SYSTOLIC BLOOD PRESSURE: 128 MMHG | HEART RATE: 96 BPM | OXYGEN SATURATION: 97 % | HEIGHT: 63 IN | DIASTOLIC BLOOD PRESSURE: 55 MMHG | RESPIRATION RATE: 15 BRPM

## 2024-06-11 DIAGNOSIS — D36.13 BENIGN NEOPLASM OF PERIPHERAL NERVES AND AUTONOMIC NERVOUS SYSTEM OF LOWER LIMB, INCLUDING HIP: Chronic | ICD-10-CM

## 2024-06-11 DIAGNOSIS — Z98.89 OTHER SPECIFIED POSTPROCEDURAL STATES: Chronic | ICD-10-CM

## 2024-06-11 PROCEDURE — 12013 RPR F/E/E/N/L/M 2.6-5.0 CM: CPT

## 2024-06-11 PROCEDURE — 99282 EMERGENCY DEPT VISIT SF MDM: CPT

## 2024-06-11 PROCEDURE — 99284 EMERGENCY DEPT VISIT MOD MDM: CPT | Mod: 25

## 2024-06-11 NOTE — ED ADULT NURSE NOTE - OBJECTIVE STATEMENT
74 y/o female received axo4 via wheelchair c/o slip and fall earlier today in her bathroom. pt reports that she typically uses cane to ambulate but did NOT have it with her in the restroom. 1cm laceration noted to right brow with blood oozing. pt denies LOC/pain.

## 2024-06-11 NOTE — ED ADULT NURSE NOTE - LEARNING READINESS
Yes Sotyktu Pregnancy And Lactation Text: There is insufficient data to evaluate whether or not Sotyktu is safe to use during pregnancy.   It is not known if Sotyktu passes into breast milk and whether or not it is safe to use when breastfeeding.

## 2024-06-11 NOTE — ED ADULT NURSE NOTE - CADM POA URETHRAL CATHETER
Detail Level: Detailed Quality 137: Melanoma: Continuity Of Care - Recall System: Recall system not utilized, reason not otherwise specified Quality 410: Psoriasis Clinical Response To Oral Systemic Or Biologic Mediations: Psoriasis Assessment Tool NOT Documented Quality 111:Pneumonia Vaccination Status For Older Adults: Pneumococcal vaccine (PPSV23) administered on or after patient’s 60th birthday and before the end of the measurement period Quality 226: Preventive Care And Screening: Tobacco Use: Screening And Cessation Intervention: Patient screened for tobacco use and is an ex/non-smoker Quality 130: Documentation Of Current Medications In The Medical Record: Current Medications Documented Quality 265: Biopsy Follow-Up: Biopsy Results not Reviewed, not Communicated to the Patient and the Patient's Primary Care/Referring Physician, Communication not Tracked in a Log, and/or Tracking Process not Documented in the Patient's Medical Record. Quality 431: Preventive Care And Screening: Unhealthy Alcohol Use - Screening: Patient not identified as an unhealthy alcohol user when screened for unhealthy alcohol use using a systematic screening method No

## 2024-06-11 NOTE — ED ADULT NURSE NOTE - NS ED NURSE DISCH DISPOSITION
Patient admitted with hepatic encephalopathy.  It was stated that he was confused.  This has improved over time.  May have been related to elevated ammonia versus  Infection.    8/19:  Given patient's recent surgery and profound weakness and inability to walk, family's request of the patient go to a swing bed as he is unable to be cared for at home.    8/20:  Likely multifactorial in this patient with baseline cognitive impairment.  He appears to be improving.    8/21: continues to improve.       Discharged

## 2024-06-11 NOTE — ED PROVIDER NOTE - OBJECTIVE STATEMENT
72yo female s/p fall with right eye laceration, pt tripped and fell in the bathroom and hit her face on the corner cabinet, no LOC, pt c/o right eye lac, no dizziness no neck pain no toehr complaints

## 2024-06-11 NOTE — ED PROVIDER NOTE - NSICDXPASTSURGICALHX_GEN_ALL_CORE_FT
PAST SURGICAL HISTORY:  Neuroma of foot excision left 2000    S/P cystoscopy with lithotripsy 3 weeks ago    S/P parathyroidectomy 2 glands 05/2014

## 2024-06-11 NOTE — ED ADULT TRIAGE NOTE - CHIEF COMPLAINT QUOTE
74 y/o female presents xo4 via wheelchair c/o slip and fall in her bathroom this morning. 1 cm laceration noted to right brow site, denies LOC

## 2024-06-11 NOTE — ED PROVIDER NOTE - PATIENT PORTAL LINK FT
You can access the FollowMyHealth Patient Portal offered by [facility] by registering at the following website: [email or web address]. By joining [employer]’s FollowMyHealth portal, you will also be able to view your health information using other applications (apps) compatible with our system.

## 2024-06-11 NOTE — ED PROVIDER NOTE - NSICDXPASTMEDICALHX_GEN_ALL_CORE_FT
PAST MEDICAL HISTORY:  Fibromyalgia     Generalized seizures (x 1) 3 years ago d/t  CNS lymphoma    Hyperparathyroidism     Insomnia     Kidney stone     Lymphoma in remission dx 2011 treatment and radiation   with chemotherapy & steriods    Lymphoma in remission     Neuropathy "chemotherapy induced"    Neuropathy     Osteoporosis     Overactive bladder     Renal calculus     Restless Leg Syndrome     Seasonal allergies     Seizure     Seizure disorder 2001 1 episode

## 2024-06-11 NOTE — ED ADULT NURSE NOTE - NSFALLHARMRISKINTERV_ED_ALL_ED

## 2024-06-11 NOTE — ED PROVIDER NOTE - NSICDXFAMILYHX_GEN_ALL_CORE_FT
FAMILY HISTORY:  Father  Still living? No  Family history of pancreatic cancer, Age at diagnosis: Age Unknown    Sibling  Still living? Unknown  Family history of breast cancer in female, Age at diagnosis: Age Unknown

## 2024-06-12 ENCOUNTER — APPOINTMENT (OUTPATIENT)
Dept: ORTHOPEDIC SURGERY | Facility: CLINIC | Age: 74
End: 2024-06-12
Payer: MEDICARE

## 2024-06-12 VITALS — WEIGHT: 118 LBS | HEIGHT: 63 IN | BODY MASS INDEX: 20.91 KG/M2

## 2024-06-12 DIAGNOSIS — S40.012A CONTUSION OF LEFT SHOULDER, INITIAL ENCOUNTER: ICD-10-CM

## 2024-06-12 DIAGNOSIS — M19.019 PRIMARY OSTEOARTHRITIS, UNSPECIFIED SHOULDER: ICD-10-CM

## 2024-06-12 PROCEDURE — 20611 DRAIN/INJ JOINT/BURSA W/US: CPT | Mod: LT

## 2024-06-12 PROCEDURE — 73030 X-RAY EXAM OF SHOULDER: CPT | Mod: LT

## 2024-06-12 PROCEDURE — 99214 OFFICE O/P EST MOD 30 MIN: CPT | Mod: 25

## 2024-06-12 NOTE — HISTORY OF PRESENT ILLNESS
[de-identified] : 73 year old RHD female presents complaining of left shoulder pain that started several weeks ago. She denies specific injury but notes that she has had a few falls recently. She notes her most recent fall was yesterday morning. She notes that her balance is not very good, which causes her to trip and fall. She is receiving PT for this. Her left shoulder pain is worst about the lateral aspect of the shoulder. It radiates into her humerus with rotation. She cannot sleep on her left side. She also has significant pain when leaning on her left arm. She has not been treated for her left shoulder in the past. She uses her cane on her right side.

## 2024-06-12 NOTE — PHYSICAL EXAM
[de-identified] : Constitutional o Appearance : well-nourished, well developed, alert, in no acute distress  Head and Face o Head :  Inspection : atraumatic, normocephalic o Face :  Inspection : no visible rash or discoloration Respiratory o Respiratory Effort: breathing unlabored  Neurologic o Sensation : Normal sensation  Psychiatric o Mood and Affect: mood normal, affect appropriate  Lymphatic o Additional Nodes : No palpable lymph nodes present   Cervical Spine o Inspection/Palpation :  Inspection : alignment midline, normal degree of lordosis present  Skin : normal appearance, no masses or tenderness, trachea midline  Palpation : musculature is nontender to palpation o Range of Motion : arc of motion full in all planes, no crepitance or pain with ROM o Tests: Negative Spurlings test  Right Upper Extremity o Right Shoulder :  Inspection/Palpation : no tenderness, no swelling or deformities  Range of Motion : full and painless in all planes, no crepitance  Strength : forward elevation 5/5, IR 5/5, ER 5/5, ER at 90 of abduction 5/5, supraspinatus 5/5, adduction 5/5, abduction 5/5, biceps/triceps 5/5,  5/5  Stability : no joint instability on provocative testing   Tests: Tatum negative, Neer negative, Dread negative, drop arm test negative, Ada's test negative  Left Upper Extremity o Left Shoulder :  Inspection/Palpation : anterior capsular tenderness, no swelling or deformities, no ecchymosis,   Range of Motion : forward flexion to 110, full internal rotation with discomfort, full ER, no crepitance  Strength : forward elevation 5/5, IR 5/5, ER 5/5, ER at 90 of abduction 5/5, supraspinatus 5/5, adduction 5/5, abduction 5/5, biceps/triceps 5/5,  5/5  Stability : no joint instability on provocative testing   Tests: Tatum negative, Neer negative, Dread negative, drop arm test negative, Ada's test negative  Gait and Station: Gait: short choppy steps, no significant extremity swelling or lymphedema, good proprioception and balance  Radiology Results 6/12/2024 o Left Shoulder : AP internal/external rotation and outlet views were obtained and revealed sclerosis of the greater tuberosity osteopenia, and small subacromial spur   o Shoulder: Indication- left shoulder arthritis, Anatomic location left intra-articular joint, Spray-area was sterilized with Betadine and alcohol and anesthetized with Ethyl Chloride , Needle used - 22G, Medications given- 5cc's lidocaine, 0.5cc's kenalog, 0.5cc's dexamethasone. The patient tolerated the procedure well. This was done under Ultrasound guidance.

## 2024-06-12 NOTE — ADDENDUM
[FreeTextEntry1] : I, LOVELY MUNOZ, acted solely as a scribe for Dr. Alfonso Kaplan on this date 06/12/2024.  All medical record entries made by the Scribe were at my, Dr. Alfonso Kaplan, direction and personally dictated by me on 06/12/2024. I have reviewed the chart and agree that the record accurately reflects my personal performance of the history, physical exam, assessment and plan. I have also personally directed, reviewed, and agreed with the chart.

## 2024-06-12 NOTE — DISCUSSION/SUMMARY
[de-identified] : I went over the pathophysiology of the patient's symptoms in great detail with the patient. I discussed the underlying pathophysiology of the patient's condition in great detail with the patient. I went over the patient's x-rays with them in great detail. The patient elected to receive a cortisone injection into her left shoulder today, and tolerated it well. I instructed the patient on ROM exercises, and told them to take it easy. The use of ice and rest was reviewed with the patient. The patient may resume activities tomorrow.   All of their questions were answered. They understand and consent to the plan.   FU in 4-6 weeks.

## 2024-06-18 ENCOUNTER — EMERGENCY (EMERGENCY)
Facility: HOSPITAL | Age: 74
LOS: 1 days | Discharge: HOME | End: 2024-06-18
Attending: EMERGENCY MEDICINE | Admitting: EMERGENCY MEDICINE
Payer: MEDICARE

## 2024-06-18 VITALS
OXYGEN SATURATION: 95 % | RESPIRATION RATE: 18 BRPM | HEIGHT: 63 IN | TEMPERATURE: 99 F | DIASTOLIC BLOOD PRESSURE: 90 MMHG | HEART RATE: 99 BPM | SYSTOLIC BLOOD PRESSURE: 154 MMHG | WEIGHT: 115.08 LBS

## 2024-06-18 DIAGNOSIS — Z98.89 OTHER SPECIFIED POSTPROCEDURAL STATES: Chronic | ICD-10-CM

## 2024-06-18 DIAGNOSIS — D36.13 BENIGN NEOPLASM OF PERIPHERAL NERVES AND AUTONOMIC NERVOUS SYSTEM OF LOWER LIMB, INCLUDING HIP: Chronic | ICD-10-CM

## 2024-06-18 PROCEDURE — G0463: CPT

## 2024-06-18 PROCEDURE — L9995: CPT

## 2024-06-18 RX ORDER — GABAPENTIN 400 MG/1
0 CAPSULE ORAL
Refills: 0 | DISCHARGE

## 2024-06-18 RX ORDER — LEVOTHYROXINE SODIUM 125 MCG
1 TABLET ORAL
Refills: 0 | DISCHARGE

## 2024-06-18 RX ORDER — CETIRIZINE HYDROCHLORIDE 10 MG/1
1 TABLET ORAL
Refills: 0 | DISCHARGE

## 2024-06-18 RX ORDER — EZETIMIBE 10 MG/1
1 TABLET ORAL
Refills: 0 | DISCHARGE

## 2024-06-18 RX ORDER — METHENAMINE MANDELATE 1 G
1 TABLET ORAL
Refills: 0 | DISCHARGE

## 2024-06-18 RX ORDER — OMEPRAZOLE 10 MG/1
1 CAPSULE, DELAYED RELEASE ORAL
Refills: 0 | DISCHARGE

## 2024-06-18 RX ORDER — LINACLOTIDE 145 UG/1
1 CAPSULE, GELATIN COATED ORAL
Refills: 0 | DISCHARGE

## 2024-06-18 RX ORDER — MONTELUKAST 4 MG/1
1 TABLET, CHEWABLE ORAL
Refills: 0 | DISCHARGE

## 2024-06-18 NOTE — ED PROVIDER NOTE - PHYSICAL EXAMINATION
Right side,  inferior to the right eyebrow with 2 cm laceration.  Sutures in place, well-approximated.  No acute separation.  No surrounding erythema or edema.  Nontender.  No other signs of head trauma    Normal gait, nonfocal neurologic exam.

## 2024-06-18 NOTE — ED PROVIDER NOTE - OBJECTIVE STATEMENT
73-year-old female presents with status post mechanical fall 1 week ago, hitting her face on the ground.  The patient was seen in the ED, and had laceration repair.  The patient has not been having any headaches.  No nausea or vomiting.  No visual changes.  No diplopia.  Patient did not have a CAT scan, but is asymptomatic and does not want additional CAT scan.  No discharge from the wound.  No redness or swelling.  Patient is here for suture removal.  No other acute complaints at this time.

## 2024-06-18 NOTE — ED PROVIDER NOTE - PROGRESS NOTE DETAILS
Discussed with patient regarding risk of acute dehiscence of the wound, infection precautions and instructions.  Discussed with patient regarding the importance of close prompt follow-up, to minimize sun exposure especially over the next 6 months, and to return with any acute changes or concerns.

## 2024-06-18 NOTE — ED ADULT NURSE NOTE - AS SC BRADEN MOISTURE
(4) rarely moist Hydroxychloroquine Counseling:  I discussed with the patient that a baseline ophthalmologic exam is needed at the start of therapy and every year thereafter while on therapy. A CBC may also be warranted for monitoring.  The side effects of this medication were discussed with the patient, including but not limited to agranulocytosis, aplastic anemia, seizures, rashes, retinopathy, and liver toxicity. Patient instructed to call the office should any adverse effect occur.  The patient verbalized understanding of the proper use and possible adverse effects of Plaquenil.  All the patient's questions and concerns were addressed.

## 2024-06-18 NOTE — ED ADULT NURSE NOTE - HIV OFFER
Pt instructed on:  Wash face with antibacterial soap prior to admission and bring eye kit.  Bring insurance card(s).  NPO after MN.  No jewelry / valuables / money.  Pt instructed to take Metoprolol and chlortrimeton (pt request)  with sip of water morning of procedure.  Hold Ramipril morning of procedure - pt is scheduled for MAC    Pt advised to have someone home with her after the procedure.  Pt verbalizes understanding.    Pt is able to lie flat on back for procedure.    Ok to go through discharge instructions with ?Yes     Previously Declined (within the last year)

## 2024-06-18 NOTE — ED ADULT NURSE NOTE - NSFALLRISKINTERV_ED_ALL_ED

## 2024-06-18 NOTE — ED ADULT NURSE NOTE - NS ED NURSE LEVEL OF CONSCIOUSNESS MENTAL STATUS
Awake/Alert/Cooperative Tissue Cultured Epidermal Autograft Text: The defect edges were debeveled with a #15c scalpel blade.  Given the location of the defect, shape of the defect and the proximity to free margins a tissue cultured epidermal autograft was deemed most appropriate.  The graft was then trimmed to fit the size of the defect.  The graft was then placed in the primary defect and oriented appropriately.

## 2024-06-18 NOTE — ED PROVIDER NOTE - PATIENT PORTAL LINK FT
You can access the FollowMyHealth Patient Portal offered by [facility] by registering at the following website: [email or web address]. By joining [employer]’s FollowMyHealth portal, you will also be able to view your health information using other applications (apps) compatible with our system.

## 2024-06-18 NOTE — ED PROVIDER NOTE - CLINICAL SUMMARY MEDICAL DECISION MAKING FREE TEXT BOX
Acute removal to right periorbital skin laceration, neurologically intact without signs of traumatic brain injury.  No signs of acute infection.  Remove sutures, outpatient follow-up.

## 2024-06-18 NOTE — ED PROVIDER NOTE - IV ALTEPLASE EXCL ABS HIDDEN
show Xelshaz Pregnancy And Lactation Text: This medication is Pregnancy Category D and is not considered safe during pregnancy.  The risk during breast feeding is also uncertain.

## 2024-07-18 ENCOUNTER — APPOINTMENT (OUTPATIENT)
Dept: ORTHOPEDIC SURGERY | Facility: CLINIC | Age: 74
End: 2024-07-18

## 2024-07-26 ENCOUNTER — APPOINTMENT (OUTPATIENT)
Dept: COLORECTAL SURGERY | Facility: CLINIC | Age: 74
End: 2024-07-26
Payer: MEDICARE

## 2024-07-26 VITALS
HEIGHT: 63 IN | OXYGEN SATURATION: 97 % | BODY MASS INDEX: 20.38 KG/M2 | WEIGHT: 115 LBS | HEART RATE: 104 BPM | SYSTOLIC BLOOD PRESSURE: 143 MMHG | TEMPERATURE: 98 F | RESPIRATION RATE: 18 BRPM | DIASTOLIC BLOOD PRESSURE: 85 MMHG

## 2024-07-26 DIAGNOSIS — K59.00 CONSTIPATION, UNSPECIFIED: ICD-10-CM

## 2024-07-26 PROCEDURE — 99204 OFFICE O/P NEW MOD 45 MIN: CPT

## 2024-07-26 RX ORDER — OMEPRAZOLE 20 MG/1
20 TABLET, DELAYED RELEASE ORAL
Refills: 0 | Status: ACTIVE | COMMUNITY

## 2024-07-26 RX ORDER — MILNACIPRAN HYDROCHLORIDE 50 MG/1
TABLET, FILM COATED ORAL
Refills: 0 | Status: ACTIVE | COMMUNITY

## 2024-07-26 RX ORDER — EZETIMIBE 10 MG/1
TABLET ORAL
Refills: 0 | Status: ACTIVE | COMMUNITY

## 2024-07-26 RX ORDER — LINACLOTIDE 72 UG/1
CAPSULE, GELATIN COATED ORAL
Refills: 0 | Status: ACTIVE | COMMUNITY

## 2024-07-26 RX ORDER — SENNOSIDES 8.6 MG TABLETS 8.6 MG/1
TABLET ORAL
Refills: 0 | Status: ACTIVE | COMMUNITY

## 2024-07-26 RX ORDER — MONTELUKAST 10 MG/1
10 TABLET, FILM COATED ORAL
Refills: 0 | Status: ACTIVE | COMMUNITY

## 2024-07-26 NOTE — HISTORY OF PRESENT ILLNESS
[FreeTextEntry1] : 73 year old female presents with chronic constipation. She is under the care of GI Dr. Nunez who recommended multiple medications. Patient will have a BM twice a week with the help of 2 caps of miralax once a day along with the senna and linzess daily. Patient states that this is a huge improvement from previously when she would have a BM once a week. SHe is currently happy with this regimen and feels comfortable.   Last attempted colonoscopy was several years ago. Patient reports it was incomplete due to tortuosity. She was sent for a virtual colonoscopy (also several years ago) - reportedly normal. Patient denies family history of colon/rectal cancer, IBD.    x3

## 2024-07-26 NOTE — CONSULT LETTER
[Dear  ___] : Dear  [unfilled], [Consult Letter:] : I had the pleasure of evaluating your patient, [unfilled]. [Please see my note below.] : Please see my note below. [Consult Closing:] : Thank you very much for allowing me to participate in the care of this patient.  If you have any questions, please do not hesitate to contact me. [Sincerely,] : Sincerely, [FreeTextEntry3] : Steven Rosario MD, FACS, FASCRS Colorectal Surgery [facility] Assistant Professor of Surgery [employer] at [address], [address] [place] [address] Tel: [phone]  Cell: [phone]  Email: [email or web address]

## 2024-07-26 NOTE — ASSESSMENT
[FreeTextEntry1] : The patient is happy with her current regimen and will continue with it for now.  I explained that the workup for constipation would include a sits marker study and an MR defecography to evaluate for colonic motility versus pelvic outlet obstruction.  She would like to hold off for now and continue with her current medications.  If her symptoms worsen she will follow-up to discuss workup and treatment She also is trying to be more active with more walking which will certainly help with her intestinal motility

## 2024-07-26 NOTE — REVIEW OF SYSTEMS
[As Noted in HPI] : as noted in HPI [Constipation] : constipation [Anxiety] : anxiety [Depression] : depression [Negative] : Integumentary [FreeTextEntry3] : wears glasses [FreeTextEntry6] : asthma, hx aspiration pnemonia (dx 2 weeks ago) [FreeTextEntry5] : AMMY [FreeTextEntry8] : hx kidney stones [de-identified] : CNS lymphoma (in remission) [FreeTextEntry9] : fibromyalgia [de-identified] : hx hyperparathyroidism s/p thymectomy [de-identified] : B-cell lymphoma (Chemo/radiation)

## 2024-07-26 NOTE — PHYSICAL EXAM
[Normal Breath Sounds] : Normal breath sounds [Normal Heart Sounds] : normal heart sounds [No Rash or Lesion] : No rash or lesion [Alert] : alert [Oriented to Person] : oriented to person [Oriented to Place] : oriented to place [Oriented to Time] : oriented to time [Calm] : calm [de-identified] : flat, soft, NT/ND, +BS [de-identified] : healthy female [de-identified] : tachycardic [de-identified] : NC/AT [de-identified] : shuffling gait, ambulating with cane [de-identified] : intact

## 2025-05-13 ENCOUNTER — APPOINTMENT (OUTPATIENT)
Dept: ORTHOPEDIC SURGERY | Facility: CLINIC | Age: 75
End: 2025-05-13
Payer: MEDICARE

## 2025-05-13 DIAGNOSIS — M19.019 PRIMARY OSTEOARTHRITIS, UNSPECIFIED SHOULDER: ICD-10-CM

## 2025-05-13 DIAGNOSIS — S46.012A STRAIN OF MUSCLE(S) AND TENDON(S) OF THE ROTATOR CUFF OF LEFT SHOULDER, INITIAL ENCOUNTER: ICD-10-CM

## 2025-05-13 PROCEDURE — 73030 X-RAY EXAM OF SHOULDER: CPT | Mod: LT

## 2025-05-13 PROCEDURE — 99214 OFFICE O/P EST MOD 30 MIN: CPT | Mod: 25

## 2025-05-13 PROCEDURE — 20611 DRAIN/INJ JOINT/BURSA W/US: CPT | Mod: LT

## 2025-06-19 ENCOUNTER — APPOINTMENT (OUTPATIENT)
Dept: ORTHOPEDIC SURGERY | Facility: CLINIC | Age: 75
End: 2025-06-19

## 2025-09-21 ENCOUNTER — NON-APPOINTMENT (OUTPATIENT)
Age: 75
End: 2025-09-21